# Patient Record
Sex: FEMALE | Race: ASIAN | Employment: FULL TIME | URBAN - METROPOLITAN AREA
[De-identification: names, ages, dates, MRNs, and addresses within clinical notes are randomized per-mention and may not be internally consistent; named-entity substitution may affect disease eponyms.]

---

## 2017-11-11 ENCOUNTER — HOSPITAL ENCOUNTER (OUTPATIENT)
Dept: CT IMAGING | Facility: HOSPITAL | Age: 37
Discharge: HOME | End: 2017-11-11
Attending: INTERNAL MEDICINE
Payer: COMMERCIAL

## 2017-11-11 DIAGNOSIS — A15.9 MYCOBACTERIUM TUBERCULOSIS INFECTION: ICD-10-CM

## 2017-11-11 DIAGNOSIS — R06.00 DYSPNEA, UNSPECIFIED TYPE: ICD-10-CM

## 2017-11-11 PROCEDURE — 71250 CT THORAX DX C-: CPT | Performed by: INTERNAL MEDICINE

## 2017-11-13 NOTE — PROGRESS NOTES
Ok to call ct showed generally stable nodules. Some small new ones. I would recommend ct in 3 months.

## 2017-11-13 NOTE — PROGRESS NOTES
Home Phone- [phone]- Spoke with patient, relayed results and recommendations per Dr Prince Mckeon, patient verbalized understanding.

## 2018-02-24 ENCOUNTER — HOSPITAL ENCOUNTER (OUTPATIENT)
Dept: CT IMAGING | Facility: HOSPITAL | Age: 38
Discharge: HOME | End: 2018-02-24
Attending: INTERNAL MEDICINE
Payer: COMMERCIAL

## 2018-02-24 DIAGNOSIS — R91.8 ABNORMAL CT LUNG SCREENING: ICD-10-CM

## 2018-02-24 PROCEDURE — 71250 CT THORAX DX C-: CPT | Performed by: INTERNAL MEDICINE

## 2018-02-27 PROBLEM — R06.83 SNORING: Status: ACTIVE | Noted: 2018-02-27

## 2018-02-27 PROBLEM — R91.8 ABNORMAL CT LUNG SCREENING: Status: ACTIVE | Noted: 2018-02-27

## 2018-02-27 PROBLEM — G47.10 HYPERSOMNIA: Status: ACTIVE | Noted: 2018-02-27

## 2021-04-14 ENCOUNTER — OFFICE VISIT (OUTPATIENT)
Dept: OBGYN CLINIC | Facility: CLINIC | Age: 41
End: 2021-04-14
Payer: COMMERCIAL

## 2021-04-14 VITALS
BODY MASS INDEX: 25.52 KG/M2 | HEIGHT: 62 IN | DIASTOLIC BLOOD PRESSURE: 60 MMHG | WEIGHT: 138.69 LBS | SYSTOLIC BLOOD PRESSURE: 114 MMHG

## 2021-04-14 DIAGNOSIS — Z12.4 ROUTINE CERVICAL SMEAR: ICD-10-CM

## 2021-04-14 DIAGNOSIS — N94.6 DYSMENORRHEA: ICD-10-CM

## 2021-04-14 DIAGNOSIS — Z12.31 ENCOUNTER FOR SCREENING MAMMOGRAM FOR MALIGNANT NEOPLASM OF BREAST: ICD-10-CM

## 2021-04-14 DIAGNOSIS — Z01.419 ENCOUNTER FOR GYNECOLOGICAL EXAMINATION WITHOUT ABNORMAL FINDING: Primary | ICD-10-CM

## 2021-04-14 DIAGNOSIS — K64.8 OTHER HEMORRHOIDS: ICD-10-CM

## 2021-04-14 DIAGNOSIS — N90.89 VULVAR LESION: ICD-10-CM

## 2021-04-14 PROCEDURE — 87624 HPV HI-RISK TYP POOLED RSLT: CPT | Performed by: OBSTETRICS & GYNECOLOGY

## 2021-04-14 PROCEDURE — 99204 OFFICE O/P NEW MOD 45 MIN: CPT | Performed by: OBSTETRICS & GYNECOLOGY

## 2021-04-14 PROCEDURE — 3074F SYST BP LT 130 MM HG: CPT | Performed by: OBSTETRICS & GYNECOLOGY

## 2021-04-14 PROCEDURE — 99386 PREV VISIT NEW AGE 40-64: CPT | Performed by: OBSTETRICS & GYNECOLOGY

## 2021-04-14 PROCEDURE — 3078F DIAST BP <80 MM HG: CPT | Performed by: OBSTETRICS & GYNECOLOGY

## 2021-04-14 PROCEDURE — 3008F BODY MASS INDEX DOCD: CPT | Performed by: OBSTETRICS & GYNECOLOGY

## 2021-04-14 RX ORDER — FAMOTIDINE 40 MG/1
TABLET, FILM COATED ORAL
COMMUNITY
Start: 2021-04-04 | End: 2021-09-08

## 2021-04-14 RX ORDER — HYDROCORTISONE 25 MG/G
CREAM TOPICAL
COMMUNITY
Start: 2021-04-03 | End: 2021-09-08

## 2021-04-14 RX ORDER — DROSPIRENONE AND ETHINYL ESTRADIOL 0.02-3(28)
1 KIT ORAL DAILY
Qty: 3 PACKAGE | Refills: 3 | Status: SHIPPED | OUTPATIENT
Start: 2021-04-14 | End: 2021-09-08

## 2021-04-14 NOTE — PROGRESS NOTES
GYN H&P   NEW PT     2021   4:11 PM    CC: Patient is here for painful menstruation & annual. Referred by Dr. Abby Hoskins. HPI: Patient is a 37year old  for annual and c/o painful periods described as cramp, worse since the last 5 M.  She orellana anxiety. ENDOCRINOLOGIC:  No reports of sweating, no cold intolerance, no heat intolerance, no excessive urination, no excessive thirst  ALLERGIES:  No history of asthma, no hives, no eczema, no rhinitis.       Depression Screening (PHQ-2/PHQ-9): Over the (Other) Sister         has \"high iron in her blood\"   • Breast Cancer Neg    • Ovarian Cancer Neg    • Colon Cancer Neg      Social History    Socioeconomic History      Marital status:       Spouse name: Not on file      Number of children: Not o hemorrhoids        A/P: Patient is 37year old female     1. Encounter for gynecological examination without abnormal finding    2. Dysmenorrhea  - US TRANSVAGINAL EMG ONLY; Future  - Drospirenone-Ethinyl Estradiol (MICHELLE) 3-0.02 MG Oral Tab;  Take 1 tablet b

## 2021-05-27 ENCOUNTER — TELEPHONE (OUTPATIENT)
Dept: OBGYN CLINIC | Facility: CLINIC | Age: 41
End: 2021-05-27

## 2021-05-27 ENCOUNTER — LAB ENCOUNTER (OUTPATIENT)
Dept: LAB | Facility: REFERENCE LAB | Age: 41
End: 2021-05-27
Attending: OBSTETRICS & GYNECOLOGY
Payer: COMMERCIAL

## 2021-05-27 DIAGNOSIS — N93.9 VAGINAL BLEEDING: ICD-10-CM

## 2021-05-27 DIAGNOSIS — N93.9 VAGINAL BLEEDING: Primary | ICD-10-CM

## 2021-05-27 PROCEDURE — 85025 COMPLETE CBC W/AUTO DIFF WBC: CPT

## 2021-05-27 PROCEDURE — 36415 COLL VENOUS BLD VENIPUNCTURE: CPT

## 2021-05-27 RX ORDER — TRANEXAMIC ACID 650 1/1
1300 TABLET ORAL 3 TIMES DAILY
Qty: 30 TABLET | Refills: 0 | Status: SHIPPED | OUTPATIENT
Start: 2021-05-27 | End: 2021-06-01

## 2021-05-27 NOTE — TELEPHONE ENCOUNTER
Called pt and per pt stopped OCP d/t continues to have vaginal bleeding not too heavy changes pad every 4 hours, started pill 04/17/21 started bleeding and has not stopped. Stopped pill 4 days ago.       Pt c/o dizziness, nausea, joints are in pain, feels

## 2021-05-27 NOTE — TELEPHONE ENCOUNTER
This RN called pt back and relayed pharmD's msg to pt. Pt confirmed that she stopped OCPs 4 days ago. This RN informed pt that taking both meds simultaneously can lead to blood clots and Rolan will review with pt as well.  Pt verbalized understanding and a

## 2021-05-27 NOTE — TELEPHONE ENCOUNTER
Patient states that her body is not responding well to the Drospirenon-Ethinyl Estradiol. Pt states that she has been bleeding everyday, therefore patient says that she is discontinuing her intake of medication.  Pt also scheduled a US pelvic exam and she w

## 2021-05-27 NOTE — TELEPHONE ENCOUNTER
Per pharmD, there is a drug interaction between [address]; can lead to blood clots. Per note below, pt stopped Cat 4 days ago. Per pharmD, ok to take Lysteda then if pt stopped Cat. PharmD will discuss with pt too.       Tranexamic Acid (LYSTEDA) 650 daily.  Dispense: 3 Package; Refill: 3  - US PELVIS (TRANSVAGINAL PELVIS) (CPT=76830); Future     3. Other hemorrhoids  - Drospirenone-Ethinyl Estradiol (MICHELLE) 3-0.02 MG Oral Tab; Take 1 tablet by mouth daily.  Dispense: 3 Package; Refill: 3     4.  Encounte

## 2021-05-28 ENCOUNTER — OFFICE VISIT (OUTPATIENT)
Dept: OBGYN CLINIC | Facility: CLINIC | Age: 41
End: 2021-05-28
Payer: COMMERCIAL

## 2021-05-28 VITALS
DIASTOLIC BLOOD PRESSURE: 64 MMHG | BODY MASS INDEX: 26.13 KG/M2 | SYSTOLIC BLOOD PRESSURE: 110 MMHG | WEIGHT: 142 LBS | HEIGHT: 62 IN

## 2021-05-28 DIAGNOSIS — K64.8 OTHER HEMORRHOIDS: ICD-10-CM

## 2021-05-28 DIAGNOSIS — N94.6 DYSMENORRHEA: Primary | ICD-10-CM

## 2021-05-28 DIAGNOSIS — N93.9 VAGINAL BLEEDING: ICD-10-CM

## 2021-05-28 PROCEDURE — 3074F SYST BP LT 130 MM HG: CPT | Performed by: OBSTETRICS & GYNECOLOGY

## 2021-05-28 PROCEDURE — 3008F BODY MASS INDEX DOCD: CPT | Performed by: OBSTETRICS & GYNECOLOGY

## 2021-05-28 PROCEDURE — 99213 OFFICE O/P EST LOW 20 MIN: CPT | Performed by: OBSTETRICS & GYNECOLOGY

## 2021-05-28 PROCEDURE — 3078F DIAST BP <80 MM HG: CPT | Performed by: OBSTETRICS & GYNECOLOGY

## 2021-05-28 NOTE — PROGRESS NOTES
CC: Patient is here for FU  Last seen 2021 for dysmenorrhea and cyclically painful hemorrhoids. HPI: Patient is a 37year old  for started OCP's 2021 for above problems.  While taking it she felt dizzy/nausea, joint pain, and overall body Number of children: Not on file      Years of education: Not on file      Highest education level: Not on file    Occupational History      Occupation: computer programer    Tobacco Use      Smoking status: Never Smoker      Smokeless tobacco: Never Used 48.0 % 40.7   MCV      80.0 - 100.0 fL 83.7   MCH      26.0 - 34.0 pg 26.1   MCHC      31.0 - 37.0 g/dL 31.2   RDW-SD      35.1 - 46.3 fL 45.7   RDW      11.0 - 15.0 % 15.0   Platelet Count      045.6 - 450.0 10(3)uL 422.0   Prelim Neutrophil Abs      1.50

## 2021-06-08 ENCOUNTER — LAB REQUISITION (OUTPATIENT)
Dept: LAB | Facility: HOSPITAL | Age: 41
End: 2021-06-08
Payer: COMMERCIAL

## 2021-06-08 DIAGNOSIS — Z01.818 ENCOUNTER FOR OTHER PREPROCEDURAL EXAMINATION: ICD-10-CM

## 2021-06-10 ENCOUNTER — HOSPITAL ENCOUNTER (OUTPATIENT)
Dept: ULTRASOUND IMAGING | Facility: HOSPITAL | Age: 41
Discharge: HOME | End: 2021-06-10
Attending: OBSTETRICS & GYNECOLOGY
Payer: COMMERCIAL

## 2021-06-10 DIAGNOSIS — N94.6 DYSMENORRHEA: ICD-10-CM

## 2021-06-10 PROCEDURE — 76856 US EXAM PELVIC COMPLETE: CPT | Performed by: OBSTETRICS & GYNECOLOGY

## 2021-06-10 PROCEDURE — 76830 TRANSVAGINAL US NON-OB: CPT | Performed by: OBSTETRICS & GYNECOLOGY

## 2021-06-11 ENCOUNTER — TELEPHONE (OUTPATIENT)
Dept: OBGYN CLINIC | Facility: CLINIC | Age: 41
End: 2021-06-11

## 2021-06-11 ENCOUNTER — LAB REQUISITION (OUTPATIENT)
Dept: LAB | Facility: HOSPITAL | Age: 41
End: 2021-06-11
Payer: COMMERCIAL

## 2021-06-11 DIAGNOSIS — K64.9 UNSPECIFIED HEMORRHOIDS: ICD-10-CM

## 2021-06-11 PROCEDURE — 88304 TISSUE EXAM BY PATHOLOGIST: CPT | Performed by: SURGERY

## 2021-08-09 NOTE — TELEPHONE ENCOUNTER
Patient waiting for office to call to schedule sonohystogram.  Patient has spotting/bleeding outside of period.

## 2021-09-08 ENCOUNTER — OFFICE VISIT (OUTPATIENT)
Dept: OBGYN CLINIC | Facility: CLINIC | Age: 41
End: 2021-09-08
Payer: COMMERCIAL

## 2021-09-08 VITALS — SYSTOLIC BLOOD PRESSURE: 106 MMHG | HEIGHT: 62 IN | DIASTOLIC BLOOD PRESSURE: 64 MMHG | BODY MASS INDEX: 26 KG/M2

## 2021-09-08 DIAGNOSIS — N92.1 METRORRHAGIA: Primary | ICD-10-CM

## 2021-09-08 DIAGNOSIS — R93.89 THICKENED ENDOMETRIUM: ICD-10-CM

## 2021-09-08 DIAGNOSIS — Z12.31 ENCOUNTER FOR SCREENING MAMMOGRAM FOR MALIGNANT NEOPLASM OF BREAST: ICD-10-CM

## 2021-09-08 DIAGNOSIS — N94.6 DYSMENORRHEA: ICD-10-CM

## 2021-09-08 PROCEDURE — 3074F SYST BP LT 130 MM HG: CPT | Performed by: OBSTETRICS & GYNECOLOGY

## 2021-09-08 PROCEDURE — 58340 CATHETER FOR HYSTEROGRAPHY: CPT | Performed by: OBSTETRICS & GYNECOLOGY

## 2021-09-08 PROCEDURE — 3078F DIAST BP <80 MM HG: CPT | Performed by: OBSTETRICS & GYNECOLOGY

## 2021-09-08 PROCEDURE — 76831 ECHO EXAM UTERUS: CPT | Performed by: OBSTETRICS & GYNECOLOGY

## 2021-09-08 RX ORDER — ERGOCALCIFEROL 1.25 MG/1
50000 CAPSULE ORAL WEEKLY
COMMUNITY
Start: 2021-08-23

## 2021-09-08 NOTE — PROGRESS NOTES
Here for toshia. Accompanied by her     7/2021 she had heavy painful period  8/2021 she had period 2 W early. She had persistent spotting 1 W prior to bleeding   She is most concerned about the painfulness of her bleeding.      I dw her how toshia

## 2025-03-19 NOTE — PROGRESS NOTES
Left message to call back
Ok to call. 1 new dominant nodule up to 18 mm, other nodules stable or smaller.  With the increasing and decreasing size of nodule over such a short period, most likely inflammatory nodules, however would recommend short term ct in 3 months to monitor
Patient contacted and was notified of results. She verbalized understanding. All questions answered.
pt left in b/s chair

## (undated) NOTE — LETTER
Sheryle Coupe, :2/15/1980    CONSENT FOR PROCEDURE/SEDATION    1. I authorize the performance upon Sheryle Coupe  the following: Sonohysterogram    2.  I authorize Dr. Mike Langley MD (and whomever is designated as the doctor’s as ____________________________________________    Witness: _________________________________________ Date:___________     Physician Signature: _______________________________ Date:___________